# Patient Record
(demographics unavailable — no encounter records)

---

## 2025-05-12 NOTE — CONSULT LETTER
[Dear  ___] : Dear  [unfilled], [Consult Letter:] : I had the pleasure of evaluating your patient, [unfilled]. [Please see my note below.] : Please see my note below. [Consult Closing:] : Thank you very much for allowing me to participate in the care of this patient.  If you have any questions, please do not hesitate to contact me. [Sincerely,] : Sincerely, [FreeTextEntry3] : Christine Palladino, CPNP Department of Pediatric Neurology [facility]  [address], [address] Tel: [phone] Fax: [phone]

## 2025-05-12 NOTE — ASSESSMENT
[FreeTextEntry1] : BLU is a 5 year old boy with no PMHx who presents to the office for difficulty concentrating and anxiousness. Non-focal exam. Will plan to do workup to r/o ADHD using [employer] forms and psychoeducational evaluation.

## 2025-05-12 NOTE — HISTORY OF PRESENT ILLNESS
[FreeTextEntry1] : 05/12/2025  BLU FLORES  is a 5 year old male who presents today for initial evaluation of ADD/ADHD  History: Teachers have concerns for inability to socialize with other students, inappropriate responses to questions, inability to focus and needing redirection, very "shy". He went to a private preK last school year and received OT and special education aid. He was evaluated through the district at 3 years old. He has not been re-evaluated since then. Mothers greatest concern is for his inability to speak for himself and his self-esteem. He is currently in a dual language program ([employer]) where he has multiple teachers. He is working with [employer] both in school and at home privately. Psychoeducational evaluation is pending for next school year. Concerns for OCD type behaviors. Mother says at 2-3 years old he socially regressed. At home he requires prompts to get work done. He will also flip and hyper fixate or hyperfocus on tasks he likes. He is a rule follower, he does not misbehave.  Never seen by neuropsych/developmental peds Developmental hx: normal Family hx of developmental delays/ADD/ADHD:  Other coexisting behaviors?  -Mood disorder/ depression: - -Anxiety: -  Social: has friends in school. He gets along well with peers though has a hard time playing and socializing.  Eating:  eats a varied diet.  Sleep: sleeps well. Play: Gymnastics, [employer] school, swim classes  No concerns for seizure activity at this time.

## 2025-05-12 NOTE — PHYSICAL EXAM
[Well-appearing] : well-appearing [Normocephalic] : normocephalic [No dysmorphic facial features] : no dysmorphic facial features [No ocular abnormalities] : no ocular abnormalities [Neck supple] : neck supple [Lungs clear] : lungs clear [Heart sounds regular in rate and rhythm] : heart sounds regular in rate and rhythm [Soft] : soft [No organomegaly] : no organomegaly [No abnormal neurocutaneous stigmata or skin lesions] : no abnormal neurocutaneous stigmata or skin lesions [Straight] : straight [No eusebio or dimples] : no eusebio or dimples [No deformities] : no deformities [Alert] : alert [Well related, good eye contact] : well related, good eye contact [Conversant] : conversant [Normal speech and language] : normal speech and language [Follows instructions well] : follows instructions well [VFF] : VFF [Pupils reactive to light and accommodation] : pupils reactive to light and accommodation [Full extraocular movements] : full extraocular movements [No nystagmus] : no nystagmus [No papilledema] : no papilledema [Normal facial sensation to light touch] : normal facial sensation to light touch [No facial asymmetry or weakness] : no facial asymmetry or weakness [Gross hearing intact] : gross hearing intact [Equal palate elevation] : equal palate elevation [Good shoulder shrug] : good shoulder shrug [Normal tongue movement] : normal tongue movement [Midline tongue, no fasciculations] : midline tongue, no fasciculations [Normal axial and appendicular muscle tone] : normal axial and appendicular muscle tone [Gets up on table without difficulty] : gets up on table without difficulty [No pronator drift] : no pronator drift [Normal finger tapping and fine finger movements] : normal finger tapping and fine finger movements [No abnormal involuntary movements] : no abnormal involuntary movements [5/5 strength in proximal and distal muscles of arms and legs] : 5/5 strength in proximal and distal muscles of arms and legs [Walks and runs well] : walks and runs well [Able to do deep knee bend] : able to do deep knee bend [Able to walk on heels] : able to walk on heels [Able to walk on toes] : able to walk on toes [2+ biceps] : 2+ biceps [Triceps] : triceps [Knee jerks] : knee jerks [Ankle jerks] : ankle jerks [No ankle clonus] : no ankle clonus [Localizes LT and temperature] : localizes LT and temperature [No dysmetria on FTNT] : no dysmetria on FTNT [Good walking balance] : good walking balance [Normal gait] : normal gait [Able to tandem well] : able to tandem well [Negative Romberg] : negative Romberg

## 2025-05-12 NOTE — PLAN
[FreeTextEntry1] : [ ]Randi forms given  [ ]Letter given to school to complete a full psychological educational evaluation [ ]Medication options for ADD/ADHD discussed and the side effect profiles [ ]Letter for school given for recommendation for 504 plan with accommodations [ ]List given for mental health providers for CBT/therapy [ ]Follow up when forms are completed